# Patient Record
Sex: FEMALE | Race: ASIAN | NOT HISPANIC OR LATINO | URBAN - METROPOLITAN AREA
[De-identification: names, ages, dates, MRNs, and addresses within clinical notes are randomized per-mention and may not be internally consistent; named-entity substitution may affect disease eponyms.]

---

## 2023-09-15 ENCOUNTER — OFFICE VISIT (OUTPATIENT)
Dept: PRIMARY CARE | Facility: CLINIC | Age: 53
End: 2023-09-15
Payer: COMMERCIAL

## 2023-09-15 VITALS
HEART RATE: 70 BPM | OXYGEN SATURATION: 98 % | SYSTOLIC BLOOD PRESSURE: 120 MMHG | TEMPERATURE: 97.4 F | WEIGHT: 130.2 LBS | BODY MASS INDEX: 24.58 KG/M2 | HEIGHT: 61 IN | DIASTOLIC BLOOD PRESSURE: 72 MMHG

## 2023-09-15 DIAGNOSIS — E78.2 HYPERLIPEMIA, MIXED: ICD-10-CM

## 2023-09-15 DIAGNOSIS — Z12.31 ENCOUNTER FOR SCREENING MAMMOGRAM FOR MALIGNANT NEOPLASM OF BREAST: ICD-10-CM

## 2023-09-15 DIAGNOSIS — D50.0 IRON DEFICIENCY ANEMIA DUE TO CHRONIC BLOOD LOSS: ICD-10-CM

## 2023-09-15 DIAGNOSIS — E07.9 THYROID DYSFUNCTION: ICD-10-CM

## 2023-09-15 DIAGNOSIS — I10 HYPERTENSION, UNSPECIFIED TYPE: ICD-10-CM

## 2023-09-15 DIAGNOSIS — Z00.01 ANNUAL VISIT FOR GENERAL ADULT MEDICAL EXAMINATION WITH ABNORMAL FINDINGS: Primary | ICD-10-CM

## 2023-09-15 PROBLEM — D50.9 IRON DEFICIENCY ANEMIA: Status: ACTIVE | Noted: 2023-09-15

## 2023-09-15 PROCEDURE — 3078F DIAST BP <80 MM HG: CPT | Performed by: INTERNAL MEDICINE

## 2023-09-15 PROCEDURE — 99396 PREV VISIT EST AGE 40-64: CPT | Performed by: INTERNAL MEDICINE

## 2023-09-15 PROCEDURE — 1036F TOBACCO NON-USER: CPT | Performed by: INTERNAL MEDICINE

## 2023-09-15 PROCEDURE — 3074F SYST BP LT 130 MM HG: CPT | Performed by: INTERNAL MEDICINE

## 2023-09-15 RX ORDER — LEVOTHYROXINE SODIUM 50 UG/1
50 TABLET ORAL DAILY
COMMUNITY
End: 2023-10-02 | Stop reason: SDUPTHER

## 2023-09-15 ASSESSMENT — PATIENT HEALTH QUESTIONNAIRE - PHQ9
8. MOVING OR SPEAKING SO SLOWLY THAT OTHER PEOPLE COULD HAVE NOTICED. OR THE OPPOSITE, BEING SO FIGETY OR RESTLESS THAT YOU HAVE BEEN MOVING AROUND A LOT MORE THAN USUAL: NOT AT ALL
5. POOR APPETITE OR OVEREATING: NOT AT ALL
1. LITTLE INTEREST OR PLEASURE IN DOING THINGS: NEARLY EVERY DAY
3. TROUBLE FALLING OR STAYING ASLEEP OR SLEEPING TOO MUCH: NOT AT ALL
9. THOUGHTS THAT YOU WOULD BE BETTER OFF DEAD, OR OF HURTING YOURSELF: NOT AT ALL
10. IF YOU CHECKED OFF ANY PROBLEMS, HOW DIFFICULT HAVE THESE PROBLEMS MADE IT FOR YOU TO DO YOUR WORK, TAKE CARE OF THINGS AT HOME, OR GET ALONG WITH OTHER PEOPLE: NOT DIFFICULT AT ALL
2. FEELING DOWN, DEPRESSED OR HOPELESS: NOT AT ALL
SUM OF ALL RESPONSES TO PHQ QUESTIONS 1-9: 3
6. FEELING BAD ABOUT YOURSELF - OR THAT YOU ARE A FAILURE OR HAVE LET YOURSELF OR YOUR FAMILY DOWN: NOT AT ALL
4. FEELING TIRED OR HAVING LITTLE ENERGY: NOT AT ALL
SUM OF ALL RESPONSES TO PHQ9 QUESTIONS 1 AND 2: 3
7. TROUBLE CONCENTRATING ON THINGS, SUCH AS READING THE NEWSPAPER OR WATCHING TELEVISION: NOT AT ALL

## 2023-09-15 NOTE — PROGRESS NOTES
Subjective   Patient ID: Saira Mason is a 53 y.o. female who presents for Annual Exam.    Assessment/Plan     Problem List Items Addressed This Visit       Iron deficiency anemia     Iron B12 folic acid supplement         Hyperlipemia, mixed     Endocrine work-up low-fat diet exercise         Relevant Orders    CBC and Auto Differential    Comprehensive Metabolic Panel    Hemoglobin A1C    Lipid Panel    TSH with reflex to Free T4 if abnormal    Uric Acid    Urinalysis Microscopic Only    Ferritin    Folate    Haptoglobin    Iron and TIBC    Reticulocytes    Vitamin B12    Encounter for screening mammogram for malignant neoplasm of breast - Primary    Relevant Orders    BI mammo bilateral screening tomosynthesis    RESOLVED: Hypertension    Relevant Orders    CT cardiac scoring wo IV contrast    Thyroid dysfunction     Continue Synthroid keep TSH less than 1            HPI  59-year-old patient  with 3 children    Negative for tobacco alcohol drug    No complaint    Personal history of hypothyroidism hyperlipidemia anemia vitamin D deficiency tendinitis    Complaining of the left ankle pain    Negative for hair loss hematuria rectal bleeding    Negative for depression or COVID    Negative for fall  Past Medical History:   Diagnosis Date    Cyst of kidney, acquired 10/03/2021    Kidney cyst, acquired    Migraine, unspecified, not intractable, without status migrainosus 10/03/2021    Migraine headache    Other instability, left ankle 09/23/2022    Instability of left ankle joint    Pain in left ankle and joints of left foot 09/23/2022    Left ankle pain    Pain in left knee 09/23/2022    Left knee pain    Pain in unspecified ankle and joints of unspecified foot 05/19/2022    Ankle pain    Peroneal tendinitis, left leg 09/23/2022    Peroneal tendinitis of left lower extremity    Personal history of diseases of the blood and blood-forming organs and certain disorders involving the immune mechanism 10/03/2021     History of anemia    Personal history of other benign neoplasm 10/03/2021    History of other benign neoplasm    Personal history of other endocrine, nutritional and metabolic disease 10/04/2021    History of vitamin D deficiency    Personal history of other infectious and parasitic diseases 04/16/2018    History of Helicobacter pylori infection    Personal history of other specified conditions 09/05/2017    History of weight gain    Personal history of urinary (tract) infections 02/10/2022    History of urinary tract infection    Unspecified injury of left ankle, initial encounter 06/04/2020    Left ankle injury     Past Surgical History:   Procedure Laterality Date    NO PAST SURGERIES       No Known Allergies  Current Outpatient Medications   Medication Sig Dispense Refill    levothyroxine (Synthroid, Levoxyl) 50 mcg tablet Take 1 tablet (50 mcg) by mouth once daily.       No current facility-administered medications for this visit.     Family History   Problem Relation Name Age of Onset    Diabetes Mother      No Known Problems Father       Social History     Socioeconomic History    Marital status:      Spouse name: None    Number of children: None    Years of education: None    Highest education level: None   Occupational History    None   Tobacco Use    Smoking status: Never    Smokeless tobacco: Never   Substance and Sexual Activity    Alcohol use: Never    Drug use: Never    Sexual activity: None   Other Topics Concern    None   Social History Narrative    None     Social Determinants of Health     Financial Resource Strain: Not on file   Food Insecurity: Not on file   Transportation Needs: Not on file   Physical Activity: Not on file   Stress: Not on file   Social Connections: Not on file   Intimate Partner Violence: Not on file   Housing Stability: Not on file     Immunization History   Administered Date(s) Administered    Flu vaccine (IIV4), preservative free *Check age/dose* 10/31/2016,  "10/23/2018, 10/11/2020, 10/19/2022    Influenza, injectable, MDCK, preservative free, quadrivalent 10/26/2021    Influenza, injectable, MDCK, quadrivalent 10/21/2019    Influenza, seasonal, injectable 09/05/2017    Novel influenza-H1N1-09, preservative-free 12/08/2009    Pfizer COVID-19 vaccine, bivalent, age 12 years and older (30 mcg/0.3 mL) 12/21/2022    Pfizer Gray Cap SARS-CoV-2 05/11/2022    Pfizer Purple Cap SARS-CoV-2 02/25/2021, 03/18/2021, 09/29/2021       Review of Systems  Review of systems is otherwise negative unless stated above or in history of present illness.    Objective   Visit Vitals  /72 (BP Location: Right arm, Patient Position: Sitting, BP Cuff Size: Adult)   Pulse 70   Temp 36.3 °C (97.4 °F)   Ht 1.549 m (5' 1\")   Wt 59.1 kg (130 lb 3.2 oz)   SpO2 98%   BMI 24.60 kg/m²   Smoking Status Never   BSA 1.59 m²     Physical Exam  Constitutional:       General: not in acute distress.   HENT:      Head: Normocephalic and atraumatic.      Nose: Nose normal.   Eyes:      Extraocular Movements: Extraocular movements intact.      Conjunctiva/sclera: Conjunctivae normal.   Cardiovascular:      Rate and Rhythm: Normal rate ,  No M/R/G  Pulmonary:      Effort: Pulmonary effort is normal.      Breath sounds: Normal, Bilat Equal AE  Skin:     General: Skin is warm.   Neurological:      Mental Status: He is alert and oriented to person, place, and time.   Psychiatric:         Mood and Affect: Mood normal.         Behavior: Behavior normal.   Musculoskeletal   FROM in all extremitirs,  Joint-no swelling or tenderness    No visits with results within 4 Month(s) from this visit.   Latest known visit with results is:   Legacy Encounter on 10/07/2022   Component Date Value Ref Range Status    Cholesterol 10/07/2022 204 (H)  0 - 199 mg/dL Final    HDL 10/07/2022 54.5  mg/dL Final    Cholesterol/HDL Ratio 10/07/2022 3.7   Final    LDL 10/07/2022 121 (H)  0 - 99 mg/dL Final    VLDL 10/07/2022 29  0 - 40 mg/dL " Final    Triglycerides 10/07/2022 143  0 - 149 mg/dL Final    Hemoglobin A1C 10/07/2022 5.4  % Final    Estimated Average Glucose 10/07/2022 108  MG/DL Final    TSH 10/07/2022 2.74  0.44 - 3.98 mIU/L Final    WBC 10/07/2022 4.6  4.4 - 11.3 x10E9/L Final    nRBC 10/07/2022 0.0  0.0 - 0.0 /100 WBC Final    RBC 10/07/2022 4.86  4.00 - 5.20 x10E12/L Final    Hemoglobin 10/07/2022 13.5  12.0 - 16.0 g/dL Final    Hematocrit 10/07/2022 41.9  36.0 - 46.0 % Final    MCV 10/07/2022 86  80 - 100 fL Final    MCHC 10/07/2022 32.2  32.0 - 36.0 g/dL Final    Platelets 10/07/2022 297  150 - 450 x10E9/L Final    RDW 10/07/2022 13.4  11.5 - 14.5 % Final    Neutrophils % 10/07/2022 40.1  40.0 - 80.0 % Final    Immature Granulocytes %, Automated 10/07/2022 0.2  0.0 - 0.9 % Final    Lymphocytes % 10/07/2022 50.1  13.0 - 44.0 % Final    Monocytes % 10/07/2022 7.0  2.0 - 10.0 % Final    Eosinophils % 10/07/2022 2.2  0.0 - 6.0 % Final    Basophils % 10/07/2022 0.4  0.0 - 2.0 % Final    Neutrophils Absolute 10/07/2022 1.82  1.20 - 7.70 x10E9/L Final    Lymphocytes Absolute 10/07/2022 2.28  1.20 - 4.80 x10E9/L Final    Monocytes Absolute 10/07/2022 0.32  0.10 - 1.00 x10E9/L Final    Eosinophils Absolute 10/07/2022 0.10  0.00 - 0.70 x10E9/L Final    Basophils Absolute 10/07/2022 0.02  0.00 - 0.10 x10E9/L Final    Glucose 10/07/2022 86  74 - 99 mg/dL Final    Sodium 10/07/2022 140  136 - 145 mmol/L Final    Potassium 10/07/2022 4.4  3.5 - 5.3 mmol/L Final    Chloride 10/07/2022 104  98 - 107 mmol/L Final    Bicarbonate 10/07/2022 28  21 - 32 mmol/L Final    Anion Gap 10/07/2022 12  10 - 20 mmol/L Final    Urea Nitrogen 10/07/2022 18  6 - 23 mg/dL Final    Creatinine 10/07/2022 1.00  0.50 - 1.05 mg/dL Final    GFR Female 10/07/2022 68  >90 mL/min/1.73m2 Final    Calcium 10/07/2022 9.6  8.6 - 10.6 mg/dL Final    Albumin 10/07/2022 4.3  3.4 - 5.0 g/dL Final    Alkaline Phosphatase 10/07/2022 79  33 - 110 U/L Final    Total Protein 10/07/2022 7.3   6.4 - 8.2 g/dL Final    AST 10/07/2022 19  9 - 39 U/L Final    Total Bilirubin 10/07/2022 1.1  0.0 - 1.2 mg/dL Final    ALT (SGPT) 10/07/2022 15  7 - 45 U/L Final    Color, Urine 10/07/2022 YELLOW  STRAW,YELLOW Final    Appearance, Urine 10/07/2022 HAZY  CLEAR Final    Specific Gravity, Urine 10/07/2022 1.024  1.005 - 1.035 Final    pH, Urine 10/07/2022 7.0  5.0 - 8.0 Final    Protein, Urine 10/07/2022 NEGATIVE  NEGATIVE mg/dL Final    Glucose, Urine 10/07/2022 NEGATIVE  NEGATIVE mg/dL Final    Blood, Urine 10/07/2022 NEGATIVE  NEGATIVE Final    Ketones, Urine 10/07/2022 NEGATIVE  NEGATIVE mg/dL Final    Bilirubin, Urine 10/07/2022 NEGATIVE  NEGATIVE Final    Urobilinogen, Urine 10/07/2022 <2.0  0.0 - 1.9 mg/dL Final    Nitrite, Urine 10/07/2022 NEGATIVE  NEGATIVE Final    Leukocyte Esterase, Urine 10/07/2022 NEGATIVE  NEGATIVE Final       Radiology: Reviewed imaging in powerchart.  No results found.      Charting was completed using voice recognition technology and may include unintended errors.

## 2023-09-27 ENCOUNTER — LAB (OUTPATIENT)
Dept: LAB | Facility: LAB | Age: 53
End: 2023-09-27
Payer: COMMERCIAL

## 2023-09-27 DIAGNOSIS — Z00.01 ANNUAL VISIT FOR GENERAL ADULT MEDICAL EXAMINATION WITH ABNORMAL FINDINGS: ICD-10-CM

## 2023-09-27 DIAGNOSIS — E78.2 HYPERLIPEMIA, MIXED: ICD-10-CM

## 2023-09-27 LAB
ALANINE AMINOTRANSFERASE (SGPT) (U/L) IN SER/PLAS: 13 U/L (ref 7–45)
ALBUMIN (G/DL) IN SER/PLAS: 4.5 G/DL (ref 3.4–5)
ALKALINE PHOSPHATASE (U/L) IN SER/PLAS: 70 U/L (ref 33–110)
ANION GAP IN SER/PLAS: 14 MMOL/L (ref 10–20)
ASPARTATE AMINOTRANSFERASE (SGOT) (U/L) IN SER/PLAS: 18 U/L (ref 9–39)
BASOPHILS (10*3/UL) IN BLOOD BY AUTOMATED COUNT: 0.03 X10E9/L (ref 0–0.1)
BASOPHILS/100 LEUKOCYTES IN BLOOD BY AUTOMATED COUNT: 0.7 % (ref 0–2)
BILIRUBIN TOTAL (MG/DL) IN SER/PLAS: 1.1 MG/DL (ref 0–1.2)
CALCIUM (MG/DL) IN SER/PLAS: 9.7 MG/DL (ref 8.6–10.6)
CARBON DIOXIDE, TOTAL (MMOL/L) IN SER/PLAS: 26 MMOL/L (ref 21–32)
CHLORIDE (MMOL/L) IN SER/PLAS: 107 MMOL/L (ref 98–107)
CHOLESTEROL (MG/DL) IN SER/PLAS: 200 MG/DL (ref 0–199)
CHOLESTEROL IN HDL (MG/DL) IN SER/PLAS: 55.7 MG/DL
CHOLESTEROL/HDL RATIO: 3.6
COBALAMIN (VITAMIN B12) (PG/ML) IN SER/PLAS: 300 PG/ML (ref 211–911)
CREATININE (MG/DL) IN SER/PLAS: 0.97 MG/DL (ref 0.5–1.05)
EOSINOPHILS (10*3/UL) IN BLOOD BY AUTOMATED COUNT: 0.11 X10E9/L (ref 0–0.7)
EOSINOPHILS/100 LEUKOCYTES IN BLOOD BY AUTOMATED COUNT: 2.4 % (ref 0–6)
ERYTHROCYTE DISTRIBUTION WIDTH (RATIO) BY AUTOMATED COUNT: 13.6 % (ref 11.5–14.5)
ERYTHROCYTE MEAN CORPUSCULAR HEMOGLOBIN CONCENTRATION (G/DL) BY AUTOMATED: 32.6 G/DL (ref 32–36)
ERYTHROCYTE MEAN CORPUSCULAR VOLUME (FL) BY AUTOMATED COUNT: 85 FL (ref 80–100)
ERYTHROCYTES (10*6/UL) IN BLOOD BY AUTOMATED COUNT: 4.86 X10E12/L (ref 4–5.2)
ESTIMATED AVERAGE GLUCOSE FOR HBA1C: 117 MG/DL
FERRITIN (UG/LL) IN SER/PLAS: 44 UG/L (ref 8–150)
FOLATE (NG/ML) IN SER/PLAS: 10.8 NG/ML
GFR FEMALE: 70 ML/MIN/1.73M2
GLUCOSE (MG/DL) IN SER/PLAS: 94 MG/DL (ref 74–99)
HAPTOGLOBIN (MG/DL) IN SER/PLAS: 139 MG/DL (ref 30–200)
HEMATOCRIT (%) IN BLOOD BY AUTOMATED COUNT: 41.4 % (ref 36–46)
HEMOGLOBIN (G/DL) IN BLOOD: 13.5 G/DL (ref 12–16)
HEMOGLOBIN (PG) IN RETICULOCYTES: 31 PG (ref 28–38)
HEMOGLOBIN A1C/HEMOGLOBIN TOTAL IN BLOOD: 5.7 %
IMMATURE GRANULOCYTES/100 LEUKOCYTES IN BLOOD BY AUTOMATED COUNT: 0.2 % (ref 0–0.9)
IMMATURE RETIC FRACTION: 9.2 % (ref 0–16)
IRON (UG/DL) IN SER/PLAS: 80 UG/DL (ref 35–150)
IRON BINDING CAPACITY (UG/DL) IN SER/PLAS: 407 UG/DL (ref 240–445)
IRON SATURATION (%) IN SER/PLAS: 20 % (ref 25–45)
LDL: 115 MG/DL (ref 0–99)
LEUKOCYTES (10*3/UL) IN BLOOD BY AUTOMATED COUNT: 4.5 X10E9/L (ref 4.4–11.3)
LYMPHOCYTES (10*3/UL) IN BLOOD BY AUTOMATED COUNT: 1.83 X10E9/L (ref 1.2–4.8)
LYMPHOCYTES/100 LEUKOCYTES IN BLOOD BY AUTOMATED COUNT: 40.7 % (ref 13–44)
MONOCYTES (10*3/UL) IN BLOOD BY AUTOMATED COUNT: 0.3 X10E9/L (ref 0.1–1)
MONOCYTES/100 LEUKOCYTES IN BLOOD BY AUTOMATED COUNT: 6.7 % (ref 2–10)
MUCUS, URINE: NORMAL /LPF
NEUTROPHILS (10*3/UL) IN BLOOD BY AUTOMATED COUNT: 2.22 X10E9/L (ref 1.2–7.7)
NEUTROPHILS/100 LEUKOCYTES IN BLOOD BY AUTOMATED COUNT: 49.3 % (ref 40–80)
NRBC (PER 100 WBCS) BY AUTOMATED COUNT: 0 /100 WBC (ref 0–0)
PLATELETS (10*3/UL) IN BLOOD AUTOMATED COUNT: 313 X10E9/L (ref 150–450)
POTASSIUM (MMOL/L) IN SER/PLAS: 4.4 MMOL/L (ref 3.5–5.3)
PROTEIN TOTAL: 7.2 G/DL (ref 6.4–8.2)
RBC, URINE: 2 /HPF (ref 0–5)
RETICULOCYTES (10*3/UL) IN BLOOD: 0.05 X10E12/L (ref 0.02–0.08)
RETICULOCYTES/100 ERYTHROCYTES IN BLOOD BY AUTOMATED COUNT: 1.1 % (ref 0.5–2)
SODIUM (MMOL/L) IN SER/PLAS: 143 MMOL/L (ref 136–145)
SQUAMOUS EPITHELIAL CELLS, URINE: 5 /HPF
THYROTROPIN (MIU/L) IN SER/PLAS BY DETECTION LIMIT <= 0.05 MIU/L: 4.79 MIU/L (ref 0.44–3.98)
THYROXINE (T4) FREE (NG/DL) IN SER/PLAS: 1.15 NG/DL (ref 0.78–1.48)
TRIGLYCERIDE (MG/DL) IN SER/PLAS: 147 MG/DL (ref 0–149)
URATE (MG/DL) IN SER/PLAS: 5.8 MG/DL (ref 2.3–6.7)
UREA NITROGEN (MG/DL) IN SER/PLAS: 17 MG/DL (ref 6–23)
VLDL: 29 MG/DL (ref 0–40)
WBC, URINE: 1 /HPF (ref 0–5)

## 2023-09-27 PROCEDURE — 85045 AUTOMATED RETICULOCYTE COUNT: CPT

## 2023-09-27 PROCEDURE — 85025 COMPLETE CBC W/AUTO DIFF WBC: CPT

## 2023-09-27 PROCEDURE — 84550 ASSAY OF BLOOD/URIC ACID: CPT

## 2023-09-27 PROCEDURE — 80061 LIPID PANEL: CPT

## 2023-09-27 PROCEDURE — 82607 VITAMIN B-12: CPT

## 2023-09-27 PROCEDURE — 82746 ASSAY OF FOLIC ACID SERUM: CPT

## 2023-09-27 PROCEDURE — 84443 ASSAY THYROID STIM HORMONE: CPT

## 2023-09-27 PROCEDURE — 83540 ASSAY OF IRON: CPT

## 2023-09-27 PROCEDURE — 36415 COLL VENOUS BLD VENIPUNCTURE: CPT

## 2023-09-27 PROCEDURE — 83036 HEMOGLOBIN GLYCOSYLATED A1C: CPT

## 2023-09-27 PROCEDURE — 81001 URINALYSIS AUTO W/SCOPE: CPT

## 2023-09-27 PROCEDURE — 83010 ASSAY OF HAPTOGLOBIN QUANT: CPT

## 2023-09-27 PROCEDURE — 82728 ASSAY OF FERRITIN: CPT

## 2023-09-27 PROCEDURE — 83550 IRON BINDING TEST: CPT

## 2023-09-27 PROCEDURE — 84439 ASSAY OF FREE THYROXINE: CPT

## 2023-09-27 PROCEDURE — 80053 COMPREHEN METABOLIC PANEL: CPT

## 2023-09-29 ENCOUNTER — TELEPHONE (OUTPATIENT)
Dept: PRIMARY CARE | Facility: CLINIC | Age: 53
End: 2023-09-29
Payer: COMMERCIAL

## 2023-10-02 DIAGNOSIS — E07.9 THYROID DYSFUNCTION: ICD-10-CM

## 2023-10-02 RX ORDER — LEVOTHYROXINE SODIUM 75 UG/1
75 TABLET ORAL DAILY
Qty: 30 TABLET | Refills: 3 | Status: SHIPPED | OUTPATIENT
Start: 2023-10-02

## 2024-04-22 ENCOUNTER — HOSPITAL ENCOUNTER (OUTPATIENT)
Dept: RADIOLOGY | Facility: CLINIC | Age: 54
Discharge: HOME | End: 2024-04-22
Payer: COMMERCIAL

## 2024-04-22 DIAGNOSIS — I10 HYPERTENSION: ICD-10-CM

## 2024-04-22 DIAGNOSIS — I10 HYPERTENSION, UNSPECIFIED TYPE: ICD-10-CM

## 2024-04-22 PROCEDURE — 75571 CT HRT W/O DYE W/CA TEST: CPT

## 2024-04-25 ENCOUNTER — TELEPHONE (OUTPATIENT)
Dept: PRIMARY CARE | Facility: CLINIC | Age: 54
End: 2024-04-25
Payer: COMMERCIAL

## 2024-04-25 NOTE — TELEPHONE ENCOUNTER
----- Message from Mat Henley MD sent at 4/25/2024  8:36 AM EDT -----  1. Coronary artery calcium score of 0#.  2. Two small noncalcified pulmonary nodules (max 4 mm).  Incidental  Finding:  A non-calcified pulmonary nodule/multiple non-calcified  pulmonary nodules measuring less than 6 mm, likely benign.  (##-Y[facility]-  Your calcium score is normal at 0 nonspecific lung nodules less than 6 mm size actually is around 4 mm size will monitor once a year follow-up with the pulmonary nodule clinic

## 2024-09-30 ENCOUNTER — APPOINTMENT (OUTPATIENT)
Dept: PRIMARY CARE | Facility: CLINIC | Age: 54
End: 2024-09-30
Payer: COMMERCIAL

## 2024-09-30 ENCOUNTER — LAB (OUTPATIENT)
Dept: LAB | Facility: LAB | Age: 54
End: 2024-09-30
Payer: COMMERCIAL

## 2024-09-30 VITALS
OXYGEN SATURATION: 98 % | WEIGHT: 128 LBS | SYSTOLIC BLOOD PRESSURE: 121 MMHG | TEMPERATURE: 97.2 F | BODY MASS INDEX: 24.17 KG/M2 | HEART RATE: 73 BPM | HEIGHT: 61 IN | DIASTOLIC BLOOD PRESSURE: 75 MMHG

## 2024-09-30 DIAGNOSIS — E78.2 HYPERLIPEMIA, MIXED: ICD-10-CM

## 2024-09-30 DIAGNOSIS — Z00.01 ANNUAL VISIT FOR GENERAL ADULT MEDICAL EXAMINATION WITH ABNORMAL FINDINGS: Primary | ICD-10-CM

## 2024-09-30 DIAGNOSIS — D50.0 IRON DEFICIENCY ANEMIA DUE TO CHRONIC BLOOD LOSS: ICD-10-CM

## 2024-09-30 DIAGNOSIS — E07.9 THYROID DYSFUNCTION: ICD-10-CM

## 2024-09-30 DIAGNOSIS — Z00.01 ANNUAL VISIT FOR GENERAL ADULT MEDICAL EXAMINATION WITH ABNORMAL FINDINGS: ICD-10-CM

## 2024-09-30 DIAGNOSIS — Z12.31 ENCOUNTER FOR SCREENING MAMMOGRAM FOR MALIGNANT NEOPLASM OF BREAST: ICD-10-CM

## 2024-09-30 LAB
ALBUMIN SERPL BCP-MCNC: 4.6 G/DL (ref 3.4–5)
ALP SERPL-CCNC: 69 U/L (ref 33–110)
ALT SERPL W P-5'-P-CCNC: 13 U/L (ref 7–45)
ANION GAP SERPL CALC-SCNC: 12 MMOL/L (ref 10–20)
APPEARANCE UR: ABNORMAL
AST SERPL W P-5'-P-CCNC: 18 U/L (ref 9–39)
BASOPHILS # BLD AUTO: 0.03 X10*3/UL (ref 0–0.1)
BASOPHILS NFR BLD AUTO: 0.6 %
BILIRUB SERPL-MCNC: 1.4 MG/DL (ref 0–1.2)
BILIRUB UR STRIP.AUTO-MCNC: NEGATIVE MG/DL
BUN SERPL-MCNC: 18 MG/DL (ref 6–23)
CALCIUM SERPL-MCNC: 9.8 MG/DL (ref 8.6–10.6)
CHLORIDE SERPL-SCNC: 105 MMOL/L (ref 98–107)
CHOLEST SERPL-MCNC: 189 MG/DL (ref 0–199)
CHOLESTEROL/HDL RATIO: 3.4
CO2 SERPL-SCNC: 28 MMOL/L (ref 21–32)
COLOR UR: ABNORMAL
CREAT SERPL-MCNC: 1.05 MG/DL (ref 0.5–1.05)
EGFRCR SERPLBLD CKD-EPI 2021: 64 ML/MIN/1.73M*2
EOSINOPHIL # BLD AUTO: 0.12 X10*3/UL (ref 0–0.7)
EOSINOPHIL NFR BLD AUTO: 2.6 %
ERYTHROCYTE [DISTWIDTH] IN BLOOD BY AUTOMATED COUNT: 13.5 % (ref 11.5–14.5)
EST. AVERAGE GLUCOSE BLD GHB EST-MCNC: 111 MG/DL
GLUCOSE SERPL-MCNC: 103 MG/DL (ref 74–99)
GLUCOSE UR STRIP.AUTO-MCNC: NORMAL MG/DL
HBA1C MFR BLD: 5.5 %
HCT VFR BLD AUTO: 43.1 % (ref 36–46)
HCV AB SER QL: NONREACTIVE
HDLC SERPL-MCNC: 55.3 MG/DL
HGB BLD-MCNC: 13.7 G/DL (ref 12–16)
HIV 1+2 AB+HIV1 P24 AG SERPL QL IA: NONREACTIVE
IMM GRANULOCYTES # BLD AUTO: 0.01 X10*3/UL (ref 0–0.7)
IMM GRANULOCYTES NFR BLD AUTO: 0.2 % (ref 0–0.9)
KETONES UR STRIP.AUTO-MCNC: NEGATIVE MG/DL
LDLC SERPL CALC-MCNC: 109 MG/DL
LEUKOCYTE ESTERASE UR QL STRIP.AUTO: NEGATIVE
LYMPHOCYTES # BLD AUTO: 2.03 X10*3/UL (ref 1.2–4.8)
LYMPHOCYTES NFR BLD AUTO: 43.4 %
MCH RBC QN AUTO: 26.6 PG (ref 26–34)
MCHC RBC AUTO-ENTMCNC: 31.8 G/DL (ref 32–36)
MCV RBC AUTO: 84 FL (ref 80–100)
MONOCYTES # BLD AUTO: 0.3 X10*3/UL (ref 0.1–1)
MONOCYTES NFR BLD AUTO: 6.4 %
MUCOUS THREADS #/AREA URNS AUTO: NORMAL /LPF
NEUTROPHILS # BLD AUTO: 2.19 X10*3/UL (ref 1.2–7.7)
NEUTROPHILS NFR BLD AUTO: 46.8 %
NITRITE UR QL STRIP.AUTO: NEGATIVE
NON HDL CHOLESTEROL: 134 MG/DL (ref 0–149)
NRBC BLD-RTO: 0 /100 WBCS (ref 0–0)
PH UR STRIP.AUTO: 5.5 [PH]
PLATELET # BLD AUTO: 322 X10*3/UL (ref 150–450)
POTASSIUM SERPL-SCNC: 4.1 MMOL/L (ref 3.5–5.3)
PROT SERPL-MCNC: 7.5 G/DL (ref 6.4–8.2)
PROT UR STRIP.AUTO-MCNC: ABNORMAL MG/DL
RBC # BLD AUTO: 5.15 X10*6/UL (ref 4–5.2)
RBC # UR STRIP.AUTO: ABNORMAL /UL
RBC #/AREA URNS AUTO: NORMAL /HPF
SODIUM SERPL-SCNC: 141 MMOL/L (ref 136–145)
SP GR UR STRIP.AUTO: 1.03
T4 FREE SERPL-MCNC: 1.43 NG/DL (ref 0.78–1.48)
TRIGL SERPL-MCNC: 126 MG/DL (ref 0–149)
TSH SERPL-ACNC: 6.74 MIU/L (ref 0.44–3.98)
UROBILINOGEN UR STRIP.AUTO-MCNC: NORMAL MG/DL
VLDL: 25 MG/DL (ref 0–40)
WBC # BLD AUTO: 4.7 X10*3/UL (ref 4.4–11.3)
WBC #/AREA URNS AUTO: NORMAL /HPF

## 2024-09-30 PROCEDURE — 99396 PREV VISIT EST AGE 40-64: CPT | Performed by: INTERNAL MEDICINE

## 2024-09-30 PROCEDURE — 80053 COMPREHEN METABOLIC PANEL: CPT

## 2024-09-30 PROCEDURE — 83036 HEMOGLOBIN GLYCOSYLATED A1C: CPT

## 2024-09-30 PROCEDURE — 3008F BODY MASS INDEX DOCD: CPT | Performed by: INTERNAL MEDICINE

## 2024-09-30 PROCEDURE — 86803 HEPATITIS C AB TEST: CPT

## 2024-09-30 PROCEDURE — 87389 HIV-1 AG W/HIV-1&-2 AB AG IA: CPT

## 2024-09-30 PROCEDURE — 84439 ASSAY OF FREE THYROXINE: CPT

## 2024-09-30 PROCEDURE — 85025 COMPLETE CBC W/AUTO DIFF WBC: CPT

## 2024-09-30 PROCEDURE — 1036F TOBACCO NON-USER: CPT | Performed by: INTERNAL MEDICINE

## 2024-09-30 PROCEDURE — 81001 URINALYSIS AUTO W/SCOPE: CPT

## 2024-09-30 PROCEDURE — 84443 ASSAY THYROID STIM HORMONE: CPT

## 2024-09-30 PROCEDURE — 36415 COLL VENOUS BLD VENIPUNCTURE: CPT

## 2024-09-30 PROCEDURE — 80061 LIPID PANEL: CPT

## 2024-09-30 ASSESSMENT — PATIENT HEALTH QUESTIONNAIRE - PHQ9
2. FEELING DOWN, DEPRESSED OR HOPELESS: NOT AT ALL
SUM OF ALL RESPONSES TO PHQ9 QUESTIONS 1 AND 2: 0
1. LITTLE INTEREST OR PLEASURE IN DOING THINGS: NOT AT ALL

## 2024-09-30 NOTE — PROGRESS NOTES
Subjective   Patient ID: Saira Mason is a 54 y.o. female who presents for Annual Exam.    Assessment/Plan     Problem List Items Addressed This Visit       Iron deficiency anemia    Hyperlipemia, mixed    Annual visit for general adult medical examination with abnormal findings - Primary    Relevant Orders    HIV 1/2 Antigen/Antibody Screen with Reflex to Confirmation    Hepatitis C antibody    Hemoglobin A1c    CBC and Auto Differential    Comprehensive Metabolic Panel    Lipid Panel    TSH with reflex to Free T4 if abnormal    Urinalysis with Reflex Microscopic    Thyroid dysfunction       HPI 54 old patient  with 3 children    1 sister 1 brother    Father have a venous thrombosis    Mother have diabetes    Personal history of hyperlipidemia hyperglycemia hypothyroidism's left ankle tendinitis    No complaint today    Negative for anxiety depression or fall    Negative for pregnancy    Negative for RSV flu or COVID-19  Past Medical History:   Diagnosis Date    Cyst of kidney, acquired 10/03/2021    Kidney cyst, acquired    Migraine, unspecified, not intractable, without status migrainosus 10/03/2021    Migraine headache    Other instability, left ankle 09/23/2022    Instability of left ankle joint    Pain in left ankle and joints of left foot 09/23/2022    Left ankle pain    Pain in left knee 09/23/2022    Left knee pain    Pain in unspecified ankle and joints of unspecified foot 05/19/2022    Ankle pain    Peroneal tendinitis, left leg 09/23/2022    Peroneal tendinitis of left lower extremity    Personal history of diseases of the blood and blood-forming organs and certain disorders involving the immune mechanism 10/03/2021    History of anemia    Personal history of other benign neoplasm 10/03/2021    History of other benign neoplasm    Personal history of other endocrine, nutritional and metabolic disease 10/04/2021    History of vitamin D deficiency    Personal history of other infectious and  parasitic diseases 04/16/2018    History of Helicobacter pylori infection    Personal history of other specified conditions 09/05/2017    History of weight gain    Personal history of urinary (tract) infections 02/10/2022    History of urinary tract infection    Unspecified injury of left ankle, initial encounter 06/04/2020    Left ankle injury     Past Surgical History:   Procedure Laterality Date    NO PAST SURGERIES       No Known Allergies  Current Outpatient Medications   Medication Sig Dispense Refill    levothyroxine (Synthroid, Levoxyl) 75 mcg tablet Take 1 tablet (75 mcg) by mouth once daily. (Patient taking differently: Take 25 mcg by mouth once daily.) 30 tablet 3     No current facility-administered medications for this visit.     Family History   Problem Relation Name Age of Onset    Diabetes Mother      No Known Problems Father       Social History     Socioeconomic History    Marital status:    Tobacco Use    Smoking status: Never    Smokeless tobacco: Never   Substance and Sexual Activity    Alcohol use: Never    Drug use: Never     Immunization History   Administered Date(s) Administered    Flu vaccine (IIV4), preservative free *Check age/dose* 10/31/2016, 10/23/2018, 10/11/2020, 10/19/2022    Flu vaccine, quadrivalent, no egg protein, age 6 month or greater (FLUCELVAX) 10/26/2021, 10/15/2023    Influenza, Unspecified 09/05/2017    Influenza, injectable, MDCK, quadrivalent 10/21/2019    Influenza, seasonal, injectable 09/05/2017    Novel influenza-H1N1-09, preservative-free 12/08/2009    Pfizer COVID-19 vaccine, 12 years and older, (30mcg/0.3mL) (Comirnaty) 10/15/2023    Pfizer COVID-19 vaccine, bivalent, age 12 years and older (30 mcg/0.3 mL) 12/21/2022    Pfizer Gray Cap SARS-CoV-2 05/11/2022    Pfizer Purple Cap SARS-CoV-2 02/25/2021, 03/18/2021, 09/29/2021       Review of Systems  Review of systems is otherwise negative unless stated above or in history of present illness.    Objective  "  Visit Vitals  /75   Pulse 73   Temp 36.2 °C (97.2 °F)   Ht 1.549 m (5' 1\")   Wt 58.1 kg (128 lb)   SpO2 98%   BMI 24.19 kg/m²   Smoking Status Never   BSA 1.58 m²     Physical Exam  Constitutional:       General: not in acute distress.   HENT:      Head: Normocephalic and atraumatic.      Nose: Nose normal.   Eyes:      Extraocular Movements: Extraocular movements intact.      Conjunctiva/sclera: Conjunctivae normal.   Cardiovascular:      Rate and Rhythm: Normal rate ,  No M/R/G  Pulmonary:      Effort: Pulmonary effort is normal.      Breath sounds: Normal, Bilat Equal AE  Skin:     General: Skin is warm.   Neurological:      Mental Status: He is alert and oriented to person, place, and time.   Psychiatric:         Mood and Affect: Mood normal.         Behavior: Behavior normal.   Musculoskeletal left ankle left malleolus tenderness  FROM in all extremitirs,  Joint-no swelling or tenderness    No visits with results within 4 Month(s) from this visit.   Latest known visit with results is:   Lab on 09/27/2023   Component Date Value Ref Range Status    WBC 09/27/2023 4.5  4.4 - 11.3 x10E9/L Final    nRBC 09/27/2023 0.0  0.0 - 0.0 /100 WBC Final    RBC 09/27/2023 4.86  4.00 - 5.20 x10E12/L Final    Hemoglobin 09/27/2023 13.5  12.0 - 16.0 g/dL Final    Hematocrit 09/27/2023 41.4  36.0 - 46.0 % Final    MCV 09/27/2023 85  80 - 100 fL Final    MCHC 09/27/2023 32.6  32.0 - 36.0 g/dL Final    Platelets 09/27/2023 313  150 - 450 x10E9/L Final    RDW 09/27/2023 13.6  11.5 - 14.5 % Final    Neutrophils % 09/27/2023 49.3  40.0 - 80.0 % Final    Immature Granulocytes %, Automated 09/27/2023 0.2  0.0 - 0.9 % Final    Lymphocytes % 09/27/2023 40.7  13.0 - 44.0 % Final    Monocytes % 09/27/2023 6.7  2.0 - 10.0 % Final    Eosinophils % 09/27/2023 2.4  0.0 - 6.0 % Final    Basophils % 09/27/2023 0.7  0.0 - 2.0 % Final    Neutrophils Absolute 09/27/2023 2.22  1.20 - 7.70 x10E9/L Final    Lymphocytes Absolute 09/27/2023 1.83  " 1.20 - 4.80 x10E9/L Final    Monocytes Absolute 09/27/2023 0.30  0.10 - 1.00 x10E9/L Final    Eosinophils Absolute 09/27/2023 0.11  0.00 - 0.70 x10E9/L Final    Basophils Absolute 09/27/2023 0.03  0.00 - 0.10 x10E9/L Final    Glucose 09/27/2023 94  74 - 99 mg/dL Final    Sodium 09/27/2023 143  136 - 145 mmol/L Final    Potassium 09/27/2023 4.4  3.5 - 5.3 mmol/L Final    Chloride 09/27/2023 107  98 - 107 mmol/L Final    Bicarbonate 09/27/2023 26  21 - 32 mmol/L Final    Anion Gap 09/27/2023 14  10 - 20 mmol/L Final    Urea Nitrogen 09/27/2023 17  6 - 23 mg/dL Final    Creatinine 09/27/2023 0.97  0.50 - 1.05 mg/dL Final    GFR Female 09/27/2023 70  >90 mL/min/1.73m2 Final    Calcium 09/27/2023 9.7  8.6 - 10.6 mg/dL Final    Albumin 09/27/2023 4.5  3.4 - 5.0 g/dL Final    Alkaline Phosphatase 09/27/2023 70  33 - 110 U/L Final    Total Protein 09/27/2023 7.2  6.4 - 8.2 g/dL Final    AST 09/27/2023 18  9 - 39 U/L Final    Total Bilirubin 09/27/2023 1.1  0.0 - 1.2 mg/dL Final    ALT (SGPT) 09/27/2023 13  7 - 45 U/L Final    Hemoglobin A1C 09/27/2023 5.7 (A)  % Final    Estimated Average Glucose 09/27/2023 117  MG/DL Final    Cholesterol 09/27/2023 200 (H)  0 - 199 mg/dL Final    HDL 09/27/2023 55.7  mg/dL Final    Cholesterol/HDL Ratio 09/27/2023 3.6   Final    LDL 09/27/2023 115 (H)  0 - 99 mg/dL Final    VLDL 09/27/2023 29  0 - 40 mg/dL Final    Triglycerides 09/27/2023 147  0 - 149 mg/dL Final    TSH 09/27/2023 4.79 (H)  0.44 - 3.98 mIU/L Final    Uric Acid 09/27/2023 5.8  2.3 - 6.7 mg/dL Final    WBC, Urine 09/27/2023 1  0 - 5 /HPF Final    RBC, Urine 09/27/2023 2  0 - 5 /HPF Final    Squamous Epithelial Cells, Urine 09/27/2023 5  /HPF Final    Mucus, Urine 09/27/2023 2+  /LPF Final    Ferritin 09/27/2023 44  8 - 150 ug/L Final    Folate 09/27/2023 10.8  >5.0 ng/mL Final    Haptoglobin 09/27/2023 139  30 - 200 mg/dL Final    Iron 09/27/2023 80  35 - 150 ug/dL Final    TIBC 09/27/2023 407  240 - 445 ug/dL Final    Iron  Saturation 09/27/2023 20 (L)  25 - 45 % Final    Retic % 09/27/2023 1.1  0.5 - 2.0 % Final    Retic Absolute 09/27/2023 0.054  0.018 - 0.083 x10E12/L Final    Immature Retic fraction 09/27/2023 9.2  0.0 - 16.0 % Final    Reticulocyte Hemoglobin 09/27/2023 31  28 - 38 pg Final    Vitamin B-12 09/27/2023 300  211 - 911 pg/mL Final    Free T4 09/27/2023 1.15  0.78 - 1.48 ng/dL Final       Radiology: Reviewed imaging in powerchart.  No results found.      Charting was completed using voice recognition technology and may include unintended errors.

## 2024-10-01 ENCOUNTER — TELEPHONE (OUTPATIENT)
Dept: PRIMARY CARE | Facility: CLINIC | Age: 54
End: 2024-10-01
Payer: COMMERCIAL

## 2024-10-01 DIAGNOSIS — E07.9 THYROID DYSFUNCTION: ICD-10-CM

## 2024-10-01 DIAGNOSIS — E78.2 HYPERLIPEMIA, MIXED: ICD-10-CM

## 2024-10-01 RX ORDER — LEVOTHYROXINE SODIUM 50 UG/1
50 TABLET ORAL DAILY
Qty: 90 TABLET | Refills: 3 | Status: SHIPPED | OUTPATIENT
Start: 2024-10-01

## 2024-10-01 NOTE — TELEPHONE ENCOUNTER
----- Message from Mat Henley sent at 10/1/2024 10:19 AM EDT -----  Cholesterol improved compared to last year continue diet exercise    Glucose little bit high 103 bilirubin 1.4 mild anemia    TSH running high advise increase levothyroxine to 50 mcg a day #90 follow-up 3 months recheck on LDL blood sugar bilirubin and TSH    Mild hematuria intermittent repeat urinalysis in 3 months please fax all the labs to Dr. Mason

## 2024-10-17 ENCOUNTER — HOSPITAL ENCOUNTER (OUTPATIENT)
Dept: RADIOLOGY | Facility: CLINIC | Age: 54
Discharge: HOME | End: 2024-10-17
Payer: COMMERCIAL

## 2024-10-17 VITALS — WEIGHT: 128 LBS | HEIGHT: 61 IN | BODY MASS INDEX: 24.17 KG/M2

## 2024-10-17 DIAGNOSIS — Z12.31 ENCOUNTER FOR SCREENING MAMMOGRAM FOR MALIGNANT NEOPLASM OF BREAST: ICD-10-CM

## 2024-10-17 PROCEDURE — 77063 BREAST TOMOSYNTHESIS BI: CPT

## 2024-10-24 ENCOUNTER — TELEPHONE (OUTPATIENT)
Dept: PRIMARY CARE | Facility: CLINIC | Age: 54
End: 2024-10-24
Payer: COMMERCIAL

## 2025-06-16 DIAGNOSIS — Z00.00 HEALTH CARE MAINTENANCE: ICD-10-CM

## 2025-06-16 RX ORDER — ONDANSETRON 8 MG/1
8 TABLET, ORALLY DISINTEGRATING ORAL EVERY 8 HOURS PRN
Qty: 30 TABLET | Refills: 0 | Status: SHIPPED | OUTPATIENT
Start: 2025-06-16